# Patient Record
Sex: MALE | URBAN - METROPOLITAN AREA
[De-identification: names, ages, dates, MRNs, and addresses within clinical notes are randomized per-mention and may not be internally consistent; named-entity substitution may affect disease eponyms.]

---

## 2021-10-29 ENCOUNTER — OFFICE VISIT (OUTPATIENT)
Dept: URBAN - METROPOLITAN AREA CLINIC 87 | Facility: CLINIC | Age: 76
End: 2021-10-29
Payer: MEDICARE

## 2021-10-29 DIAGNOSIS — H25.13 AGE-RELATED NUCLEAR CATARACT, BILATERAL: Primary | ICD-10-CM

## 2021-10-29 PROCEDURE — 99204 OFFICE O/P NEW MOD 45 MIN: CPT | Performed by: OPHTHALMOLOGY

## 2021-10-29 ASSESSMENT — INTRAOCULAR PRESSURE
OS: 7
OD: 10

## 2021-10-29 NOTE — IMPRESSION/PLAN
Impression: Age-related nuclear cataract, bilateral: H25.13 Bilateral.
-PSC component OU Plan: Pt with history of Ulcerative colitis. Has been on multiple courses of Prednisone in past. Has PSC component to cataracts. Complains of poor night vision. Discussed wiht patient cataract most likely explains symptoms as the rest of his exam appears normal. Pt is concerned about other causes of night blindness such as Vit A deficiency. I explained to him this is pretty rare. He has not had any bowel resection for his UC. If he is concerned about this, I would have him see his PCP about getting his serum Vit A level checked. Refer for cataract evaluation.